# Patient Record
Sex: MALE | Race: BLACK OR AFRICAN AMERICAN | URBAN - METROPOLITAN AREA
[De-identification: names, ages, dates, MRNs, and addresses within clinical notes are randomized per-mention and may not be internally consistent; named-entity substitution may affect disease eponyms.]

---

## 2023-11-22 ENCOUNTER — APPOINTMENT (RX ONLY)
Dept: URBAN - METROPOLITAN AREA CLINIC 133 | Facility: CLINIC | Age: 28
Setting detail: DERMATOLOGY
End: 2023-11-22

## 2023-11-22 VITALS — HEIGHT: 68 IN | WEIGHT: 165 LBS

## 2023-11-22 DIAGNOSIS — L40.8 OTHER PSORIASIS: ICD-10-CM | Status: WORSENING

## 2023-11-22 PROCEDURE — 99204 OFFICE O/P NEW MOD 45 MIN: CPT

## 2023-11-22 PROCEDURE — ? ADDITIONAL NOTES

## 2023-11-22 PROCEDURE — ? COUNSELING

## 2023-11-22 PROCEDURE — ? TREATMENT REGIMEN

## 2023-11-22 PROCEDURE — ? PRESCRIPTION

## 2023-11-22 PROCEDURE — ? PHOTO-DOCUMENTATION

## 2023-11-22 RX ORDER — FLUOCINONIDE 0.5 MG/G
GEL TOPICAL
Qty: 60 | Refills: 0 | Status: ERX | COMMUNITY
Start: 2023-11-22

## 2023-11-22 RX ADMIN — FLUOCINONIDE: 0.5 GEL TOPICAL at 00:00

## 2023-11-22 ASSESSMENT — LOCATION DETAILED DESCRIPTION DERM
LOCATION DETAILED: LEFT SMALL FINGERNAIL
LOCATION DETAILED: RIGHT SMALL FINGERNAIL
LOCATION DETAILED: RIGHT RING FINGERNAIL
LOCATION DETAILED: LEFT MIDDLE FINGERNAIL

## 2023-11-22 ASSESSMENT — LOCATION SIMPLE DESCRIPTION DERM
LOCATION SIMPLE: LEFT SMALL FINGER
LOCATION SIMPLE: RIGHT SMALL FINGER
LOCATION SIMPLE: LEFT MIDDLE FINGER
LOCATION SIMPLE: RIGHT RING FINGER

## 2023-11-22 ASSESSMENT — LOCATION ZONE DERM: LOCATION ZONE: FINGERNAIL

## 2023-11-22 NOTE — PROCEDURE: TREATMENT REGIMEN
Modify Regimen: Wear gloves when doing wet work
Initiate Treatment: DermNail, purchase OTC and apply opposite time of the day from the steroid\\nFluocinonide Gel, apply once daily to nails.
Detail Level: Zone

## 2023-11-22 NOTE — PROCEDURE: ADDITIONAL NOTES
Additional Notes: I have advised for him to stop lifting the sandbags as part of his workout to see if this is trauma.  However, I am not sure that this is associated with his current nail condition.
Render Risk Assessment In Note?: no
Detail Level: Simple

## 2023-11-22 NOTE — PROCEDURE: PHOTO-DOCUMENTATION
Date & Time: 12/2/2024, 3:25 PM  Patient: Mary López  Encounter Provider(s):    Michael Gil MD       To Whom It May Concern:    Mary López was seen and treated in our department on 12/2/2024. He should not return to work until Thursday, December 5, 2024 .    If you have any questions or concerns, please do not hesitate to call.        _____________________________  Physician/AP[facility] Signature           
  Date & Time: 12/2/2024, 4:16 PM  Patient: Dayton López  Encounter Provider(s):    Michael Gil MD       To Whom It May Concern:    Dayton López was seen and treated in our department on 12/2/2024. He should not return to work until 12/5/2024    If you have any questions or concerns, please do not hesitate to call.        _____________________________  Physician/[facility] Signature           
Photo Preface (Leave Blank If You Do Not Want): Photographs were obtained today
Detail Level: Zone

## 2024-01-24 ENCOUNTER — APPOINTMENT (RX ONLY)
Dept: URBAN - METROPOLITAN AREA CLINIC 133 | Facility: CLINIC | Age: 29
Setting detail: DERMATOLOGY
End: 2024-01-24

## 2024-01-24 VITALS — WEIGHT: 160 LBS | HEIGHT: 68 IN

## 2024-01-24 DIAGNOSIS — L40.8 OTHER PSORIASIS: ICD-10-CM | Status: IMPROVED

## 2024-01-24 PROCEDURE — 99213 OFFICE O/P EST LOW 20 MIN: CPT

## 2024-01-24 PROCEDURE — ? COUNSELING

## 2024-01-24 PROCEDURE — ? TREATMENT REGIMEN

## 2024-01-24 ASSESSMENT — LOCATION DETAILED DESCRIPTION DERM
LOCATION DETAILED: RIGHT RING FINGERNAIL
LOCATION DETAILED: LEFT SMALL FINGERNAIL
LOCATION DETAILED: RIGHT SMALL FINGERNAIL
LOCATION DETAILED: LEFT MIDDLE FINGERNAIL

## 2024-01-24 ASSESSMENT — LOCATION ZONE DERM: LOCATION ZONE: FINGERNAIL

## 2024-01-24 NOTE — PROCEDURE: TREATMENT REGIMEN
Plan: FU 1yr, sooner if unimporved/worsening\\nBriefly discussed doing a biologic, per pt not as bothersome to him for work\\nClipped R mid fingernail down with nail clippers
Otc Regimen: Vitamin D PO, discussed to check vitamin D with PCP
Detail Level: Zone
Continue Regimen: OTC DermNail QAM \\nFluocinonide Gel QHS\\nWearing gloves when doing wet work